# Patient Record
Sex: FEMALE | Race: WHITE
[De-identification: names, ages, dates, MRNs, and addresses within clinical notes are randomized per-mention and may not be internally consistent; named-entity substitution may affect disease eponyms.]

---

## 2018-11-21 ENCOUNTER — HOSPITAL ENCOUNTER (EMERGENCY)
Dept: HOSPITAL 25 - UCCORT | Age: 68
Discharge: HOME | End: 2018-11-21
Payer: MEDICARE

## 2018-11-21 VITALS — DIASTOLIC BLOOD PRESSURE: 58 MMHG | SYSTOLIC BLOOD PRESSURE: 139 MMHG

## 2018-11-21 DIAGNOSIS — J40: Primary | ICD-10-CM

## 2018-11-21 DIAGNOSIS — F17.210: ICD-10-CM

## 2018-11-21 DIAGNOSIS — Z79.82: ICD-10-CM

## 2018-11-21 PROCEDURE — 99202 OFFICE O/P NEW SF 15 MIN: CPT

## 2018-11-21 PROCEDURE — G0463 HOSPITAL OUTPT CLINIC VISIT: HCPCS

## 2018-11-21 NOTE — UC
UC General HPI





- HPI Summary


HPI Summary: 





PT C/O 8 DAY HX  COUGH, CHEST CONGESTION AND DARK, THICK-GREEN SPUTUM.


SOME SUBJECTIVE FEVER AND CHILLS.


NO CP OR SOB.


DENIES HX COPD/ASTHMA.


COUGH KEEPS AWAKE.





- History of Current Complaint


Chief Complaint: UCRespiratory


Stated Complaint: SINUSES, COUGH


Time Seen by Provider: 11/21/18 12:52


Hx Obtained From: Patient


Onset/Duration: Gradual Onset


Timing: Constant


Pain Intensity: 0





- Allergy/Home Medications


Allergies/Adverse Reactions: 


 Allergies











Allergy/AdvReac Type Severity Reaction Status Date / Time


 


No Known Allergies Allergy   Verified 11/21/18 13:02











Home Medications: 


 Home Medications





Aspirin 81 mg CHEW TAB* [Aspirin Low Dose TAB*] 81 mg PO DAILY 11/21/18 [

History Confirmed 11/21/18]


Cyanocobalamin (Vitamin B-12) [Vitamin B-12] 1,000 mcg PO DAILY 11/21/18 [

History Confirmed 11/21/18]


GuaiFENesin DM* [Robitussin DM*] 10 ml PO Q6H PRN 11/21/18 [History Confirmed 11 /21/18]


Naproxen Sodium [Aleve] 220 mg PO DAILY PRN 11/21/18 [History Confirmed 11/21/18

]











PMH/Surg Hx/FS Hx/Imm Hx


Previously Healthy: Yes





- Surgical History


Surgical History: Yes


Surgery Procedure, Year, and Place: R knee arthroscopy





- Social History


Lives: With Family


Alcohol Use: None


Substance Use Type: None


Smoking Status (MU): Light Every Day Tobacco Smoker


Type: Cigarettes


Amount Used/How Often: 4-5 cigarettes daily





Review of Systems


All Other Systems Reviewed And Are Negative: Yes


Constitutional: Positive: Negative


Skin: Positive: Negative


Eyes: Positive: Negative


ENT: Positive: Negative


Respiratory: Positive: Shortness Of Breath, Cough


Cardiovascular: Positive: Negative


Gastrointestinal: Positive: Negative


Genitourinary: Positive: Negative


Motor: Positive: Negative


Neurovascular: Positive: Negative


Musculoskeletal: Positive: Negative


Neurological: Positive: Negative


Psychological: Positive: Negative





Physical Exam


Triage Information Reviewed: Yes


Appearance: Well-Appearing


Vital Signs: 


 Initial Vital Signs











Temp  97.5 F   11/21/18 13:05


 


Pulse  88   11/21/18 13:05


 


Resp  14   11/21/18 13:05


 


BP  139/58   11/21/18 13:05


 


Pulse Ox  96   11/21/18 13:05











Vital Signs Reviewed: Yes


Eyes: Positive: Conjunctiva Clear


ENT: Positive: Pharynx normal, TMs normal.  Negative: Nasal congestion, Nasal 

drainage


Neck: Positive: Supple, Nontender, No Lymphadenopathy


Respiratory: Positive: No respiratory distress, Decreased breath sounds - MILD, 

Other: - HARSH CONGESTED COUGH


Cardiovascular: Positive: RRR, No Murmur


Abdomen Description: Positive: Nontender, No Organomegaly, Soft


Bowel Sounds: Positive: Present


Musculoskeletal: Positive: ROM Intact


Neurological: Positive: Alert


Psychological: Positive: Age Appropriate Behavior


Skin Exam: Normal





Course/Dx





- Course


Course Of Treatment: 69 YO FEMALE WITH NON RESOLVING COUGH AND CONGESTION X 8 

DAYS AND PURULENT SPUTUM. PT HAS SELF TX WITH MULTIPLE OTC MEDICATION AND GETS 

NO RELIEF. NO CONCERN FOR PNEUMONIA, NON TOXIC AND NOPT HYPOXIC. WILL TX FOR 

PRESUMTIVE BACTERIAL BRONCHITIS. PT WILL ONLY TAKE A PCN GROUP ANTIBIOTIC THUS 

WILL TX WITH AUGMENTIN.





- Differential Dx - Multi-Symptom


Provider Diagnoses: BRONCHITIS





Discharge





- Sign-Out/Discharge


Documenting (check all that apply): Patient Departure


All imaging exams completed and their final reports reviewed: No Studies





- Discharge Plan


Condition: Stable


Disposition: HOME


Prescriptions: 


Amoxicillin/Clavulanate TAB* [Augmentin *] 875 mg PO BID 7 Days #14 tab


Patient Education Materials:  Acute Bronchitis (ED)


Referrals: 


SANDRA Davies [Medical Doctor] - If Needed


Additional Instructions: 


FOLLOW UP IN 5-7 DAYS IF NOT BETTER OR SOONER IF WORSE.





- Billing Disposition and Condition


Condition: STABLE


Disposition: Home